# Patient Record
Sex: FEMALE | Race: WHITE
[De-identification: names, ages, dates, MRNs, and addresses within clinical notes are randomized per-mention and may not be internally consistent; named-entity substitution may affect disease eponyms.]

---

## 2017-06-21 ENCOUNTER — HOSPITAL ENCOUNTER (OUTPATIENT)
Dept: HOSPITAL 53 - M LABDRAW1 | Age: 42
End: 2017-06-21
Attending: PHYSICIAN ASSISTANT
Payer: COMMERCIAL

## 2017-06-21 DIAGNOSIS — M25.522: Primary | ICD-10-CM

## 2017-06-21 LAB
EOSINOPHIL NFR BLD MANUAL: 1 % (ref 0–5)
ERYTHROCYTE [DISTWIDTH] IN BLOOD BY AUTOMATED COUNT: 13.1 % (ref 11.5–14.5)
ERYTHROCYTE [SEDIMENTATION RATE] IN BLOOD BY WESTERGREN METHOD: 8 MM/HR (ref 0–20)
MCH RBC QN AUTO: 29.6 PG (ref 27–33)
MCHC RBC AUTO-ENTMCNC: 33.5 G/DL (ref 32–36.5)
MCV RBC AUTO: 88.2 FL (ref 80–96)
RBC MORPH BLD: NORMAL
URATE SERPL-MCNC: 3.8 MG/DL (ref 2.6–6)
WBC # BLD AUTO: 7 K/MM3 (ref 4–10)

## 2017-06-24 LAB
B BURGDOR IGG+IGM SER-ACNC: 0.91 ISR (ref 0–0.9)
B BURGDOR IGM SER IA-ACNC: <0.8 INDEX (ref 0–0.79)

## 2019-07-29 ENCOUNTER — HOSPITAL ENCOUNTER (OUTPATIENT)
Dept: HOSPITAL 53 - M WUC | Age: 44
End: 2019-07-29
Attending: PHYSICIAN ASSISTANT
Payer: COMMERCIAL

## 2019-07-29 DIAGNOSIS — S67.22XA: Primary | ICD-10-CM

## 2019-07-29 NOTE — REP
Clinical:  Trauma. Crush injury.

 

Technique:  AP, lateral, bilateral oblique views left hand .

 

Findings:  The osseous structures and joint spaces are intact and normal.  There

is no evidence for acute fracture or dislocation.  Surrounding soft tissues are

unremarkable.  No subcutaneous emphysema or radiodense foreign body.

 

Impression:

 No acute fracture or dislocation.

 

 

Electronically Signed by

Mike Leon MD 07/29/2019 05:39 P